# Patient Record
Sex: MALE | Race: WHITE | NOT HISPANIC OR LATINO | Employment: STUDENT | URBAN - NONMETROPOLITAN AREA
[De-identification: names, ages, dates, MRNs, and addresses within clinical notes are randomized per-mention and may not be internally consistent; named-entity substitution may affect disease eponyms.]

---

## 2019-02-26 ENCOUNTER — OFFICE VISIT (OUTPATIENT)
Dept: ENDOCRINOLOGY | Facility: CLINIC | Age: 19
End: 2019-02-26

## 2019-02-26 VITALS
OXYGEN SATURATION: 98 % | HEIGHT: 69 IN | DIASTOLIC BLOOD PRESSURE: 68 MMHG | HEART RATE: 98 BPM | BODY MASS INDEX: 21.94 KG/M2 | SYSTOLIC BLOOD PRESSURE: 120 MMHG | WEIGHT: 148.1 LBS

## 2019-02-26 DIAGNOSIS — N62 GYNECOMASTIA: Primary | ICD-10-CM

## 2019-02-26 PROCEDURE — 99244 OFF/OP CNSLTJ NEW/EST MOD 40: CPT | Performed by: INTERNAL MEDICINE

## 2019-03-11 PROBLEM — N62 GYNECOMASTIA: Status: ACTIVE | Noted: 2019-03-11

## 2019-03-12 NOTE — PROGRESS NOTES
Rogelio Reilly is a 18 y.o. male patient that     comes for direct consultation request from Tori Jim APRN for my opinion regarding     Chief Complaint   Patient presents with   • Gynecomastia             gynecomastia    Duration - 2 years    Timing - gynecomastia is Constant    Quality - not controlled    Severity -   High for him     Complications -  none    Current symptoms/problems - breast tenderness under nipple areola complex    Alleviating Factors - None     Aggravating Factors - None      Past Medical History:   Diagnosis Date   • Gynecomastia 3/11/2019     No family history on file.  Social History     Tobacco Use   • Smoking status: Never Smoker   • Smokeless tobacco: Never Used   Substance Use Topics   • Alcohol use: No     Frequency: Never   • Drug use: No         Current Outpatient Medications:   •  Cariprazine HCl (VRAYLAR) 1.5 MG capsule capsule, Take 1.5 mg by mouth Daily., Disp: , Rfl:     Review of Systems    Review of Systems   Constitutional: Negative for activity change, appetite change, chills, diaphoresis, fatigue, fever and unexpected weight change.   HENT: Negative for congestion, dental problem, drooling, ear discharge, ear pain, facial swelling, mouth sores, postnasal drip, rhinorrhea, sinus pressure, sore throat, tinnitus, trouble swallowing and voice change.    Eyes: Negative for photophobia, pain, discharge, redness, itching and visual disturbance.   Respiratory: Negative for apnea, cough, choking, chest tightness, shortness of breath, wheezing and stridor.    Cardiovascular: Negative for chest pain, palpitations and leg swelling.   Gastrointestinal: Negative for abdominal distention, abdominal pain, constipation, diarrhea, nausea and vomiting.   Endocrine: Negative for cold intolerance, heat intolerance, polydipsia, polyphagia and polyuria.        Breast enlargement   Genitourinary: Negative for decreased urine volume, difficulty urinating, dysuria, flank pain, frequency,  "hematuria and urgency.   Musculoskeletal: Negative for arthralgias, back pain, gait problem, joint swelling, myalgias, neck pain and neck stiffness.   Skin: Negative for color change, pallor, rash and wound.   Allergic/Immunologic: Negative for immunocompromised state.   Neurological: Negative for dizziness, tremors, seizures, syncope, facial asymmetry, speech difficulty, weakness, light-headedness, numbness and headaches.   Hematological: Negative for adenopathy.   Psychiatric/Behavioral: Negative for agitation, behavioral problems, confusion, decreased concentration, dysphoric mood, hallucinations, self-injury, sleep disturbance and suicidal ideas. The patient is not nervous/anxious and is not hyperactive.         Objective:   /68   Pulse 98   Ht 175.3 cm (69\")   Wt 67.2 kg (148 lb 1.6 oz)   SpO2 98%   BMI 21.87 kg/m²     Physical Exam   Constitutional: He is oriented to person, place, and time. He appears well-developed and well-nourished. He is cooperative.   HENT:   Head: Normocephalic and atraumatic.   Right Ear: External ear normal.   Left Ear: External ear normal.   Nose: Nose normal.   Mouth/Throat: Oropharynx is clear and moist. No oropharyngeal exudate.   Eyes: Conjunctivae and EOM are normal. Pupils are equal, round, and reactive to light. No scleral icterus. Right eye exhibits normal extraocular motion. Left eye exhibits normal extraocular motion.   Neck: Neck supple. No JVD present. No muscular tenderness present. No tracheal deviation, no edema and no erythema present. No thyromegaly present.   Cardiovascular: Normal rate, regular rhythm, normal heart sounds and intact distal pulses. Exam reveals no gallop and no friction rub.   No murmur heard.  Pulmonary/Chest: Effort normal and breath sounds normal. No stridor. No respiratory distress. He has no decreased breath sounds. He has no wheezes. He has no rhonchi. He has no rales. He exhibits no tenderness.   Abdominal: Soft. Bowel sounds are " normal. He exhibits no distension and no mass. There is no hepatomegaly. There is no tenderness. There is no rebound and no guarding. No hernia.   Musculoskeletal: Normal range of motion. He exhibits no edema, tenderness or deformity.   Lymphadenopathy:     He has no cervical adenopathy.   Neurological: He is alert and oriented to person, place, and time. He has normal reflexes. No cranial nerve deficit. He exhibits normal muscle tone. Coordination normal.   Skin: Skin is warm. No rash noted. No erythema. No pallor.   Glandular tissue under nipple areola complex symmetric, no axillary lymphadenopathy   Psychiatric: He has a normal mood and affect. His behavior is normal. Judgment and thought content normal.   Nursing note and vitals reviewed.      Lab Review    No results found for this or any previous visit.      Assessment/Plan       ICD-10-CM ICD-9-CM   1. Gynecomastia N62 611.1         I reviewed and summarized records from Tori Jim APRN from current year  and I reviewed / ordered labs.   From review of records :    Pt has gynecomastia, 2 years, so aromatase inhibitors or tamoxifen wouldn't be effective.     No pseudogynecomastia, breast exam not concerning for breast cancer , doesn't refer testicular masses.     Workup as below to rule out hypogonadism , liver/ kidney disease, hyperthyroidism , hcg elevation, prolactin elevation     If all workup is normal - refer to surgery       Orders Placed This Encounter   Procedures   • Testosterone, Bioavailable (M)     Standing Status:   Future   • Sex Horm Binding Globulin     Standing Status:   Future   • Prolactin     Standing Status:   Future   • FSH & LH     Standing Status:   Future   • Comprehensive Metabolic Panel     Standing Status:   Future   • Testosterone     Standing Status:   Future   • Estradiol     Standing Status:   Future   • hCG, Serum, Qualitative     Standing Status:   Future   • Ambulatory Referral to General Surgery     Standing Status:    Future     Referral Priority:   Routine     Referral Type:   Consultation     Referral Reason:   Specialty Services Required     Referred to Provider:   Garrison Wray MD     Requested Specialty:   General Surgery     Number of Visits Requested:   1   • CBC & Differential     Standing Status:   Future     Order Specific Question:   Manual Differential     Answer:   No       Please see my above opinion and suggestions.     I will see patient as needed    A copy of my note was sent to Tori Jim APRN

## 2019-07-05 ENCOUNTER — CONSULT (OUTPATIENT)
Dept: SURGERY | Facility: CLINIC | Age: 19
End: 2019-07-05

## 2019-07-05 VITALS
TEMPERATURE: 98 F | DIASTOLIC BLOOD PRESSURE: 60 MMHG | WEIGHT: 142.6 LBS | BODY MASS INDEX: 21.12 KG/M2 | HEIGHT: 69 IN | SYSTOLIC BLOOD PRESSURE: 120 MMHG | HEART RATE: 92 BPM

## 2019-07-05 DIAGNOSIS — N62 GYNECOMASTIA: ICD-10-CM

## 2019-07-05 PROCEDURE — 99202 OFFICE O/P NEW SF 15 MIN: CPT | Performed by: SURGERY

## 2019-07-05 RX ORDER — ISOTRETINOIN 40 MG/1
40 CAPSULE ORAL 2 TIMES DAILY
COMMUNITY

## 2019-07-05 NOTE — PATIENT INSTRUCTIONS

## 2019-07-05 NOTE — PROGRESS NOTES
CC: Gynecomastia       HPI  18 year old man with a hx of chronic asymptomatic gynecomastia. Referred for possible surgical management. No FH hx of breast cancer. No inciting medications.  Past Medical History:   Diagnosis Date   • Gynecomastia 3/11/2019       No past surgical history on file.      Current Outpatient Medications:   •  ISOtretinoin (ACCUTANE) 40 MG capsule, Take 40 mg by mouth 2 (Two) Times a Day., Disp: , Rfl:   •  Cariprazine HCl (VRAYLAR) 1.5 MG capsule capsule, Take 1.5 mg by mouth Daily., Disp: , Rfl:     Allergies   Allergen Reactions   • Azithromycin Unknown (See Comments)       No family history on file.    Social History     Socioeconomic History   • Marital status: Single     Spouse name: Not on file   • Number of children: Not on file   • Years of education: Not on file   • Highest education level: Not on file   Tobacco Use   • Smoking status: Never Smoker   • Smokeless tobacco: Never Used   Substance and Sexual Activity   • Alcohol use: No     Frequency: Never   • Drug use: No   • Sexual activity: Defer       Review of Systems   Constitutional: Negative for activity change, appetite change, chills and fever.   HENT: Negative for hearing loss, nosebleeds and trouble swallowing.    Cardiovascular: Negative for chest pain, palpitations and leg swelling.   Gastrointestinal: Negative for abdominal distention, abdominal pain, anal bleeding, blood in stool, constipation, diarrhea, nausea, rectal pain and vomiting.   Endocrine: Negative for cold intolerance, heat intolerance, polydipsia and polyuria.   Genitourinary: Negative for decreased urine volume, difficulty urinating, dysuria, enuresis, frequency, hematuria and urgency.   Musculoskeletal: Negative for arthralgias, back pain, gait problem, myalgias and neck pain.   Skin: Negative for pallor, rash and wound.   Allergic/Immunologic: Negative for immunocompromised state.   Neurological: Negative for dizziness, seizures, weakness,  light-headedness, numbness and headaches.   Psychiatric/Behavioral: Negative for agitation and behavioral problems. The patient is not nervous/anxious.        Physical Exam   Pulmonary/Chest:             ASSESSMENT    Diagnoses and all orders for this visit:    Gynecomastia        PLAN    1. Observation for now. Surgery if sx worsen              This document has been electronically signed by Garrison Wray MD on July 14, 2019 9:53 AM